# Patient Record
Sex: FEMALE | Race: BLACK OR AFRICAN AMERICAN | URBAN - METROPOLITAN AREA
[De-identification: names, ages, dates, MRNs, and addresses within clinical notes are randomized per-mention and may not be internally consistent; named-entity substitution may affect disease eponyms.]

---

## 2017-04-28 ENCOUNTER — OFFICE VISIT (OUTPATIENT)
Dept: FAMILY MEDICINE CLINIC | Facility: CLINIC | Age: 37
End: 2017-04-28

## 2017-04-28 VITALS
TEMPERATURE: 98 F | HEIGHT: 69 IN | DIASTOLIC BLOOD PRESSURE: 68 MMHG | RESPIRATION RATE: 14 BRPM | WEIGHT: 224 LBS | SYSTOLIC BLOOD PRESSURE: 100 MMHG | BODY MASS INDEX: 33.18 KG/M2 | HEART RATE: 104 BPM

## 2017-04-28 DIAGNOSIS — N92.6 MENSTRUAL IRREGULARITY: ICD-10-CM

## 2017-04-28 DIAGNOSIS — R05.9 COUGH: ICD-10-CM

## 2017-04-28 DIAGNOSIS — J01.00 ACUTE MAXILLARY SINUSITIS, RECURRENCE NOT SPECIFIED: Primary | ICD-10-CM

## 2017-04-28 PROCEDURE — 81025 URINE PREGNANCY TEST: CPT | Performed by: NURSE PRACTITIONER

## 2017-04-28 PROCEDURE — 99203 OFFICE O/P NEW LOW 30 MIN: CPT | Performed by: NURSE PRACTITIONER

## 2017-04-28 RX ORDER — AMOXICILLIN AND CLAVULANATE POTASSIUM 875; 125 MG/1; MG/1
1 TABLET, FILM COATED ORAL 2 TIMES DAILY
Qty: 14 TABLET | Refills: 0 | Status: SHIPPED | OUTPATIENT
Start: 2017-04-28 | End: 2017-05-05

## 2017-04-28 RX ORDER — GUAIFENESIN 1200 MG/1
TABLET, EXTENDED RELEASE ORAL
Qty: 14 TABLET | Refills: 0 | Status: SHIPPED | OUTPATIENT
Start: 2017-04-28

## 2017-04-28 NOTE — PATIENT INSTRUCTIONS
Sinusitis (Antibiotic Treatment)    The sinuses are air-filled spaces within the bones of the face. They connect to the inside of the nose. Sinusitis is an inflammation of the tissue lining the sinus cavity. Sinus inflammation can occur during a cold.  It · Do not use nasal rinses or irrigation during an acute sinus infection, unless told to by your health care provider. Rinsing may spread the infection to other sinuses.   · Use acetaminophen or ibuprofen to control pain, unless another pain medicine was pre · Lean forward slightly. · Cough twice—2 short coughs. · Relax for a few seconds. Repeat the steps as needed. The “danielle” technique  · Sit on a chair with both feet on the floor. · Take a slow, deep breath through your nose and hold for 2 counts.   · To

## 2017-04-28 NOTE — PROGRESS NOTES
CHIEF COMPLAINT:   Patient presents with:  URI      HPI:   Toshia Bazan is a 39year old female who presents with URI symptoms  for 7 days. Onset was gradual ,   Symptoms are progressing.   Location is reported as mid face, ears, and upper chest  Descrip NEURO: + sinus headaches. No numbness, tingling in face, or dizziness. PSYCH: Patient reports she unde care of Dr. Constantino Correia at [facility], is current with treatments plan, and is no mental or emotional distress.      EXAM:   /68 mmHg Acute maxillary sinusitis, recurrence not specified  (primary encounter diagnosis)  Cough    POC HCG test was negative    PLAN:   Sinusitis & Cough    Meds as below. Comfort care instructions as listed below and in Patient Instructions.     Patient instruc · Take the full course of antibiotics as instructed. Do not stop taking them, even if you feel better. · Drink plenty of water, hot tea, and other liquids. This may help thin mucus. It also may promote sinus drainage.   · Heat may help soothe painful areas Call your healthcare provider if any of these occur:  · Facial pain or headache becoming more severe  · Stiff neck  · Unusual drowsiness or confusion  · Swelling of the forehead or eyelids  · Vision problems, including blurred or double vision  · Fever of  © 4487-4920 [address], [address] [place]. All rights reserved. This information is not intended as a substitute for professional medical care. Always follow your healthcare professional's instructions.             Jerica Bishop

## 2017-11-25 ENCOUNTER — OFFICE VISIT (OUTPATIENT)
Dept: FAMILY MEDICINE CLINIC | Facility: CLINIC | Age: 37
End: 2017-11-25

## 2017-11-25 VITALS
DIASTOLIC BLOOD PRESSURE: 70 MMHG | OXYGEN SATURATION: 98 % | HEART RATE: 61 BPM | SYSTOLIC BLOOD PRESSURE: 110 MMHG | TEMPERATURE: 98 F | RESPIRATION RATE: 14 BRPM

## 2017-11-25 DIAGNOSIS — Z02.9 ENCOUNTERS FOR ADMINISTRATIVE PURPOSE: Primary | ICD-10-CM

## 2017-11-25 NOTE — PROGRESS NOTES
Patient presents in undetermined distress, reports she has CP and SOB but also reports she is fine and needs a note for school. Per patient report symptoms started 2 months ago and have been progressing.  Patient reports concurrent cough and sinus pressure

## 2017-12-30 ENCOUNTER — OFFICE VISIT (OUTPATIENT)
Dept: FAMILY MEDICINE CLINIC | Facility: CLINIC | Age: 37
End: 2017-12-30

## 2017-12-30 VITALS
DIASTOLIC BLOOD PRESSURE: 80 MMHG | SYSTOLIC BLOOD PRESSURE: 116 MMHG | RESPIRATION RATE: 20 BRPM | TEMPERATURE: 98 F | HEART RATE: 88 BPM | OXYGEN SATURATION: 97 %

## 2017-12-30 DIAGNOSIS — J06.9 UPPER RESPIRATORY TRACT INFECTION, UNSPECIFIED TYPE: Primary | ICD-10-CM

## 2017-12-30 PROCEDURE — 99213 OFFICE O/P EST LOW 20 MIN: CPT

## 2017-12-30 RX ORDER — QUETIAPINE 100 MG/1
100 TABLET, FILM COATED ORAL
COMMUNITY
Start: 2017-08-21

## 2017-12-30 RX ORDER — LITHIUM CARBONATE 300 MG
900 TABLET ORAL
COMMUNITY
Start: 2017-08-21

## 2017-12-30 RX ORDER — LAMOTRIGINE 25 MG/1
25 TABLET ORAL
COMMUNITY
Start: 2017-08-21

## 2017-12-30 RX ORDER — PRAZOSIN HYDROCHLORIDE 1 MG/1
1 CAPSULE ORAL
COMMUNITY
Start: 2017-08-21

## 2017-12-30 RX ORDER — AMOXICILLIN 875 MG/1
875 TABLET, COATED ORAL 2 TIMES DAILY
Qty: 20 TABLET | Refills: 0 | Status: SHIPPED | OUTPATIENT
Start: 2017-12-30

## 2017-12-30 NOTE — PROGRESS NOTES
CHIEF COMPLAINT:   Patient presents with:  Cough/URI: sore throat    HPI:   Rasheeda Cornejo is a 40year old female who presents with upper respiratory symptoms mostly or sore throat and cough for  5 - 7 days. Patient reports sore throat and runny nose. SKIN: no rashes,no suspicious lesions but has well healed scars from prior\"cutting\" on left forearm. Has a healing puncture wound or partial granulation on left lateral antecubital area from IV access/ no bandage on site.   HEAD: atraumatic, normocephalic · Symptoms include runny nose, coughing and sneezing, and sore throat. Cold symptoms tend to be milder than flu symptoms. · Symptoms tend to come on slowly. They last for a few days to about a week.   · With a cold, you can still do most of the things you No one likes getting sick. To protect yourself and others from cold and flu germs:  · Wash your hands often. Use alcohol-based hand  when you don’t have access to soap and water. · Don’t touch your eyes, nose, and mouth.  This may help you keep ge · Symptoms include fever, headache, fatigue, cough, sore throat, runny nose, and muscle aches. Children may have upset stomach and vomiting, but adults usually don’t. · Symptoms tend to come on quickly.  Some, such as fatigue and cough, can last a few week · Try to avoid people with respiratory infections. You may want to stay out of crowds during flu season (winter). · Ask your healthcare provider if you should get a pneumonia vaccination. How to wash your hands  · Use warm water and plenty of soap.  Work

## 2017-12-30 NOTE — PATIENT INSTRUCTIONS
Preventing Common Respiratory Infections  Respiratory infections such as colds and influenza (“the flu”) are common in winter. These infections are often caused by viruses. They may share some symptoms, but not all respiratory infections are the same.  So A flu vaccine protects you from influenza (but not other colds or infections). Get a vaccine each fall, before flu season starts. This can be done at a clinic, healthcare provider’s office, drugstore, senior center, or through your workplace.   Get pneumoco Respiratory infections such as colds and influenza (“the flu”) are common in winter. These infections are often caused by viruses. They may share some symptoms, but not all respiratory infections are the same. Some make you more sick than others.  You can t

## 2018-12-17 ENCOUNTER — OFFICE VISIT (OUTPATIENT)
Dept: FAMILY MEDICINE CLINIC | Facility: CLINIC | Age: 38
End: 2018-12-17

## 2018-12-17 DIAGNOSIS — Z02.9 ENCOUNTER FOR ADMINISTRATIVE EXAMINATIONS, UNSPECIFIED: Primary | ICD-10-CM

## 2018-12-17 NOTE — PROGRESS NOTES
Individual in distress presents to walk in clinic saying \"help me\", crying, shaking, and saying her left lower abdomen is painful, rating it a 10/10 and describes it as sharp.  Pt also reporting weakness, emesis, and inability to hold any fluids or fo

## (undated) NOTE — MR AVS SNAPSHOT
[address]  [phone]               Thank you for choosing us for your health care visit with Polo Navarrete NP.   We are glad to serve you and happy to provide you with this summary of · Over-the-counter decongestants may be used unless a similar medicine was prescribed. Nasal sprays work the fastest. Use one that contains phenylephrine or oxymetazoline. First blow the nose gently. Then use the spray.  Do not use these medicines more ofte [address]. All rights reserved. This information is not intended as a substitute for professional medical care. Always follow your healthcare professional's instructions.         Airway Clearance: Coughing Techniques    Airway clearance techniques help to remov Take 1 tablet by mouth 2 (two) times daily. Commonly known as:  AUGMENTIN           GuaiFENesin ER 1200 MG Tb12   Take 1 tab PO every 12 hours for 7 days.                 Where to Get Your Medications      These medications were sent to [address] - Choose whole grain products Foods high in sodium   Water is best for hydration Fast food.    Eat at home when possible     Tips for increasing your physical activity – Adults who are physically active are less likely to develop some chronic diseases than ad

## (undated) NOTE — Clinical Note
Date: 4/28/2017    Patient Name: Angelica Tan          To Whom it may concern: This letter has been written at the patient's request. The above patient was seen at the [facility] for treatment of a medical condition.     This patient shoul